# Patient Record
Sex: FEMALE | Race: WHITE | Employment: STUDENT | URBAN - METROPOLITAN AREA
[De-identification: names, ages, dates, MRNs, and addresses within clinical notes are randomized per-mention and may not be internally consistent; named-entity substitution may affect disease eponyms.]

---

## 2021-01-20 ENCOUNTER — OFFICE VISIT (OUTPATIENT)
Dept: OBGYN CLINIC | Facility: CLINIC | Age: 21
End: 2021-01-20
Payer: COMMERCIAL

## 2021-01-20 VITALS
HEIGHT: 68.5 IN | WEIGHT: 153 LBS | SYSTOLIC BLOOD PRESSURE: 110 MMHG | BODY MASS INDEX: 22.92 KG/M2 | DIASTOLIC BLOOD PRESSURE: 68 MMHG

## 2021-01-20 DIAGNOSIS — Z11.3 SCREENING EXAMINATION FOR STD (SEXUALLY TRANSMITTED DISEASE): ICD-10-CM

## 2021-01-20 DIAGNOSIS — Z30.09 GENERAL COUNSELING AND ADVICE ON FEMALE CONTRACEPTION: ICD-10-CM

## 2021-01-20 DIAGNOSIS — Z01.419 ENCOUNTER FOR WELL WOMAN EXAM WITH ROUTINE GYNECOLOGICAL EXAM: Primary | ICD-10-CM

## 2021-01-20 PROCEDURE — 99385 PREV VISIT NEW AGE 18-39: CPT | Performed by: OBSTETRICS & GYNECOLOGY

## 2021-01-20 PROCEDURE — 99202 OFFICE O/P NEW SF 15 MIN: CPT | Performed by: OBSTETRICS & GYNECOLOGY

## 2021-01-20 PROCEDURE — 3074F SYST BP LT 130 MM HG: CPT | Performed by: OBSTETRICS & GYNECOLOGY

## 2021-01-20 PROCEDURE — 3008F BODY MASS INDEX DOCD: CPT | Performed by: OBSTETRICS & GYNECOLOGY

## 2021-01-20 PROCEDURE — 3078F DIAST BP <80 MM HG: CPT | Performed by: OBSTETRICS & GYNECOLOGY

## 2021-01-20 NOTE — PROGRESS NOTES
Annual Gyn Exam    HPI  Pt is a transfer from Dr. Aaron Resendiz who has retired. Patient is here for an annual exam and also would like to discuss contraception.     OB History    Para Term  AB Living   0 0 0 0 0 0   SAB TAB Ectopic Multiple Live heard.  Pulmonary/Chest: Effort normal and breath sounds normal. No respiratory distress. She has no wheezes. She has no rales. Abdominal: Soft. She exhibits no distension and no mass. There is no abdominal tenderness.    Genitourinary:    Vagina and uter We discussed that it is possible to have progesterone related side effects including weight changes, mood changes, skin changes and headaches. I also discussed the possibility of cramping with menses. I counseled patient on the ParaGard/copper IUD.   We d

## 2021-01-26 ENCOUNTER — TELEPHONE (OUTPATIENT)
Dept: OBGYN CLINIC | Facility: CLINIC | Age: 21
End: 2021-01-26

## 2021-01-26 LAB
C TRACH DNA SPEC QL NAA+PROBE: POSITIVE
N GONORRHOEA DNA SPEC QL NAA+PROBE: NEGATIVE

## 2021-01-27 ENCOUNTER — TELEPHONE (OUTPATIENT)
Dept: OBGYN CLINIC | Facility: CLINIC | Age: 21
End: 2021-01-27

## 2021-01-27 DIAGNOSIS — A74.9 POSITIVE CHLAMYDIA PCR: Primary | ICD-10-CM

## 2021-01-27 NOTE — TELEPHONE ENCOUNTER
Pt called and informed of results and recommendations. Pt voices understanding. Pt wants to call back and reschedule her 3 month follow up. Pt placed in follow up book. Phone number sent to pt per diaz to schedule labs. And follow up appointment.  Orders

## 2021-01-27 NOTE — TELEPHONE ENCOUNTER
----- Message from Dev Guillen MD sent at 1/27/2021 10:08 AM CST -----  Result noted. I attempted to call patient with no answer on 1/26 as well as 1/27.   I left a message on patient's voicemail that we are attempting to contact her about her test r

## 2021-01-28 ENCOUNTER — TELEPHONE (OUTPATIENT)
Dept: OBGYN CLINIC | Facility: CLINIC | Age: 21
End: 2021-01-28

## 2021-01-28 RX ORDER — AZITHROMYCIN 500 MG/1
TABLET, FILM COATED ORAL
Qty: 2 TABLET | Refills: 1 | Status: SHIPPED | OUTPATIENT
Start: 2021-01-28 | End: 2021-09-14

## 2021-01-28 NOTE — TELEPHONE ENCOUNTER
Patient requesting script that was already order to be sent to different pharmacy. Patient is requesting to send to send to: [facility] [address]   *did not provide name of rx

## 2021-01-28 NOTE — TELEPHONE ENCOUNTER
LORRAINETCB    Canceled prescription at [address] [facility]. Sent in same rx to preferred pharmacy as previously ordered by Boogie Cardoza.

## 2021-02-17 ENCOUNTER — TELEPHONE (OUTPATIENT)
Dept: OBGYN CLINIC | Facility: CLINIC | Age: 21
End: 2021-02-17

## 2021-04-12 ENCOUNTER — OFFICE VISIT (OUTPATIENT)
Dept: OBGYN CLINIC | Facility: CLINIC | Age: 21
End: 2021-04-12
Payer: COMMERCIAL

## 2021-04-12 VITALS — DIASTOLIC BLOOD PRESSURE: 64 MMHG | WEIGHT: 125 LBS | BODY MASS INDEX: 19 KG/M2 | SYSTOLIC BLOOD PRESSURE: 128 MMHG

## 2021-04-12 DIAGNOSIS — Z86.19 HISTORY OF CHLAMYDIA INFECTION: Primary | ICD-10-CM

## 2021-04-12 DIAGNOSIS — N89.8 VAGINAL DISCHARGE: ICD-10-CM

## 2021-04-12 PROCEDURE — 3074F SYST BP LT 130 MM HG: CPT | Performed by: OBSTETRICS & GYNECOLOGY

## 2021-04-12 PROCEDURE — 99213 OFFICE O/P EST LOW 20 MIN: CPT | Performed by: OBSTETRICS & GYNECOLOGY

## 2021-04-12 PROCEDURE — 3078F DIAST BP <80 MM HG: CPT | Performed by: OBSTETRICS & GYNECOLOGY

## 2021-04-12 NOTE — PROGRESS NOTES
[facility], [facility]  Obstetrics and Gynecology  Focused Gynecology Problem Exam  MD Ann Marie Canelajosh Campos is a 23year old female presenting for Gyn Exam ([address])  .     HPI:   Patient presents with:  Gyn Exam: GROVER    Patient is here today for a repe here for test of cure.   Vaginal discharge cervical discharge noted on exam.    (Z86.19) History of chlamydia infection  (primary encounter diagnosis)  Plan: GENITAL VAGINOSIS SCREEN, CHLAMYDIA/GONOCOCCUS,        JESSICA, CHLAMYDIA/GONOCOCCUS, JESSICA, GENITAL

## 2021-09-14 ENCOUNTER — OFFICE VISIT (OUTPATIENT)
Dept: OBGYN CLINIC | Facility: CLINIC | Age: 21
End: 2021-09-14
Payer: COMMERCIAL

## 2021-09-14 VITALS — SYSTOLIC BLOOD PRESSURE: 104 MMHG | BODY MASS INDEX: 21 KG/M2 | WEIGHT: 142 LBS | DIASTOLIC BLOOD PRESSURE: 62 MMHG

## 2021-09-14 DIAGNOSIS — Z11.3 ENCOUNTER FOR SCREENING EXAMINATION FOR SEXUALLY TRANSMITTED DISEASE: ICD-10-CM

## 2021-09-14 DIAGNOSIS — N90.9 LESION OF FEMALE PERINEUM: ICD-10-CM

## 2021-09-14 DIAGNOSIS — Z20.2 STD EXPOSURE: Primary | ICD-10-CM

## 2021-09-14 PROCEDURE — 3078F DIAST BP <80 MM HG: CPT | Performed by: NURSE PRACTITIONER

## 2021-09-14 PROCEDURE — 3074F SYST BP LT 130 MM HG: CPT | Performed by: NURSE PRACTITIONER

## 2021-09-14 PROCEDURE — 99213 OFFICE O/P EST LOW 20 MIN: CPT | Performed by: NURSE PRACTITIONER

## 2021-09-14 RX ORDER — AZITHROMYCIN 500 MG/1
TABLET, FILM COATED ORAL
Qty: 2 TABLET | Refills: 0 | Status: SHIPPED | OUTPATIENT
Start: 2021-09-14

## 2021-09-14 NOTE — PROGRESS NOTES
[facility], [facility]  Obstetrics and Gynecology  Diana Castro, MSN, APRN, FNP-BC    HPI   Lauren Louie is a 23year old female who presents to the clinic for Gyn Exam (Std testing )  . Here for STD testing.   She was sexually active with someone who has Insecurity:       Worried About Running Out of Food in the Last Year: Not on file      Ran Out of Food in the Last Year: Not on file  Transportation Needs:       Lack of Transportation (Medical): Not on file      Lack of Transportation (Non-Medical):  Not o enlarged and not tender. Adnexa:         Right: No mass, tenderness or fullness. Left: No mass, tenderness or fullness. Comments: 2 small non-pustular lesions posterior to right labia. HSV culture collected.     Lymphadenopathy:

## 2021-09-15 ENCOUNTER — TELEPHONE (OUTPATIENT)
Dept: OBGYN CLINIC | Facility: CLINIC | Age: 21
End: 2021-09-15

## 2021-09-15 LAB
C TRACH DNA SPEC QL NAA+PROBE: POSITIVE
HSV1 DNA SPEC QL NAA+PROBE: NEGATIVE
HSV2 DNA SPEC QL NAA+PROBE: NEGATIVE
N GONORRHOEA DNA SPEC QL NAA+PROBE: NEGATIVE

## 2021-09-16 LAB
GENITAL VAGINOSIS SCREEN: NEGATIVE
TRICHOMONAS SCREEN: NEGATIVE

## 2021-11-10 ENCOUNTER — TELEPHONE (OUTPATIENT)
Dept: OBGYN CLINIC | Facility: CLINIC | Age: 21
End: 2021-11-10

## 2021-11-10 NOTE — TELEPHONE ENCOUNTER
Attempted to reach patient to follow up on a helping schedule a visit for GROVER from her last visit with AB for positive Chlamydia. Left message to call back office. Please help schedule visit for GROVER.

## 2023-10-12 ENCOUNTER — WALK IN (OUTPATIENT)
Dept: URGENT CARE | Age: 23
End: 2023-10-12
Attending: EMERGENCY MEDICINE

## 2023-10-12 ENCOUNTER — HOSPITAL ENCOUNTER (OUTPATIENT)
Dept: GENERAL RADIOLOGY | Age: 23
Discharge: HOME | End: 2023-10-12
Attending: EMERGENCY MEDICINE

## 2023-10-12 VITALS
TEMPERATURE: 98 F | DIASTOLIC BLOOD PRESSURE: 77 MMHG | RESPIRATION RATE: 16 BRPM | OXYGEN SATURATION: 98 % | WEIGHT: 137.35 LBS | HEART RATE: 90 BPM | SYSTOLIC BLOOD PRESSURE: 115 MMHG

## 2023-10-12 DIAGNOSIS — R05.1 ACUTE COUGH: ICD-10-CM

## 2023-10-12 DIAGNOSIS — J00 ACUTE RHINITIS: ICD-10-CM

## 2023-10-12 DIAGNOSIS — R05.1 ACUTE COUGH: Primary | ICD-10-CM

## 2023-10-12 DIAGNOSIS — J20.9 ACUTE BRONCHITIS WITH WHEEZING: ICD-10-CM

## 2023-10-12 DIAGNOSIS — J18.9 PNEUMONIA OF LEFT LOWER LOBE DUE TO INFECTIOUS ORGANISM: ICD-10-CM

## 2023-10-12 PROCEDURE — 71046 X-RAY EXAM CHEST 2 VIEWS: CPT

## 2023-10-12 PROCEDURE — 99202 OFFICE O/P NEW SF 15 MIN: CPT

## 2023-10-12 RX ORDER — ALBUTEROL SULFATE 90 UG/1
2 AEROSOL, METERED RESPIRATORY (INHALATION) 4 TIMES DAILY
Qty: 1 EACH | Refills: 0 | Status: SHIPPED | OUTPATIENT
Start: 2023-10-12 | End: 2023-10-19

## 2023-10-12 RX ORDER — AZITHROMYCIN 200 MG/5ML
12 POWDER, FOR SUSPENSION ORAL DAILY
Qty: 93.5 ML | Refills: 0 | Status: SHIPPED | OUTPATIENT
Start: 2023-10-12 | End: 2023-10-17

## 2023-10-12 RX ORDER — FLUTICASONE PROPIONATE 50 MCG
1 SPRAY, SUSPENSION (ML) NASAL DAILY
Qty: 16 G | Refills: 0 | Status: SHIPPED | OUTPATIENT
Start: 2023-10-12 | End: 2023-11-11

## 2023-10-12 ASSESSMENT — ENCOUNTER SYMPTOMS
HEADACHES: 0
COUGH: 1
ABDOMINAL PAIN: 0
SORE THROAT: 1
DIZZINESS: 0
SHORTNESS OF BREATH: 0
BACK PAIN: 0
BRUISES/BLEEDS EASILY: 0
DIARRHEA: 0
CHILLS: 0
FEVER: 1
RHINORRHEA: 1
VOMITING: 0
WHEEZING: 1

## 2023-10-12 ASSESSMENT — PAIN SCALES - GENERAL
PAINLEVEL: 5
PAINLEVEL: 7

## 2025-08-18 ENCOUNTER — APPOINTMENT (OUTPATIENT)
Dept: URBAN - METROPOLITAN AREA CLINIC 244 | Age: 25
Setting detail: DERMATOLOGY
End: 2025-08-25

## 2025-08-18 DIAGNOSIS — L30.9 DERMATITIS, UNSPECIFIED: ICD-10-CM

## 2025-08-18 RX ORDER — TACROLIMUS 1 MG/G
OINTMENT TOPICAL
Qty: 30 | Refills: 6 | Status: ERX | COMMUNITY
Start: 2025-08-18

## 2025-08-18 ASSESSMENT — LOCATION SIMPLE DESCRIPTION DERM
LOCATION SIMPLE: RIGHT CHEEK
LOCATION SIMPLE: LEFT EYEBROW

## 2025-08-18 ASSESSMENT — LOCATION ZONE DERM: LOCATION ZONE: FACE

## 2025-08-18 ASSESSMENT — LOCATION DETAILED DESCRIPTION DERM
LOCATION DETAILED: RIGHT CENTRAL MALAR CHEEK
LOCATION DETAILED: LEFT CENTRAL EYEBROW

## (undated) NOTE — MR AVS SNAPSHOT
After Visit Summary   1/20/2021    Abraham Ruffin    MRN: [id number]           Visit Information     Date & Time  1/20/2021  8:40 AM Provider  Rebecca Torres MD [address] [place], [place] [address].  Phone  [phone] VIDEO VISITS  Visit face-to-face with a [facility] physician or   DARON using your mobile device or computer   using [employer].    e-VISITS  Communicate with a [facility] Physician or DARON online. The physician will respond and provide   a treatment plan within a few hours.

## (undated) NOTE — LETTER
2/26/2021              [address] [place] [address] [id number]         Dear Ramya Webster records indicate that the lab tests ordered for you by Severa Dixons, MD  have not been done.   If you have, in fact, already comp